# Patient Record
(demographics unavailable — no encounter records)

---

## 2024-12-04 NOTE — RISK ASSESSMENT
[Little interest or pleasure doing things] : 1) Little interest or pleasure doing things [Feeling down, depressed, or hopeless] : 2) Feeling down, depressed, or hopeless [0] : 2) Feeling down, depressed, or hopeless: Not at all (0) [PHQ-9 Negative - No further assessment needed] : PHQ-9 Negative - No further assessment needed [[id number]] : 0 [No Increased risk of SCA or SCD] : No Increased risk of SCA or SCD

## 2024-12-04 NOTE — HISTORY OF PRESENT ILLNESS
[Mother] : mother [Yes] : Patient goes to dentist yearly [Normal] : normal [Eats meals with family] : eats meals with family [Grade: ____] : Grade: [unfilled] [Normal Performance] : normal performance [Normal Behavior/Attention] : normal behavior/attention [Normal Homework] : normal homework [Has friends] : has friends [Uses electronic nicotine delivery system] : does not use electronic nicotine delivery system [Uses tobacco] : does not use tobacco [Uses drugs] : does not use drugs  [Drinks alcohol] : does not drink alcohol [Uses safety belts/safety equipment] : uses safety belts/safety equipment  [No] : Patient has not had sexual intercourse [Has ways to cope with stress] : has ways to cope with stress [Displays self-confidence] : displays self-confidence [Has problems with sleep] : does not have problems with sleep [Gets depressed, anxious, or irritable/has mood swings] : does not get depressed, anxious, or irritable/has mood swings [Has thought about hurting self or considered suicide] : has not thought about hurting self or considered suicide [FreeTextEntry7] : pt has been well

## 2025-01-13 NOTE — HISTORY OF PRESENT ILLNESS
[de-identified] : 13 year old female with weight loss, decreased appetite with early satiety July 28, 2024 eval in ED at [facility] for abd pain, nausea and dizziness with syncope. The following day had diarrhea.  2 further episodes in August 2024. Evaluated by neuro as reported headaches, nausea, visual aura.  If she eats after 7 PM will experience vomiting. Typically has abdominal pain in the morning when wakes. Can wake at night with pain. Inc eructation, feels food coming up. No food sticking. BMs occur Nghia deluna 3 or 4.  Weight loss approx 10 pounds  Denies cannabis, vaping Trialed Omeprazole without improvement, felt symptoms were worse.  LMP early January ROS: eyeglasses, headaches Father left December 31, 2022. Limited contact.  Family Hx: Paternal family - migraines mother Hx of H pylori in the past

## 2025-01-13 NOTE — PHYSICAL EXAM
[Well Developed] : well developed [NAD] : in no acute distress [PERRL] : pupils were equal, round, reactive to light  [Moist & Pink Mucous Membranes] : moist and pink mucous membranes [CTAB] : lungs clear to auscultation bilaterally [Regular Rate and Rhythm] : regular rate and rhythm [Normal S1, S2] : normal S1 and S2 [Soft] : soft  [Normal Bowel Sounds] : normal bowel sounds [No HSM] : no hepatosplenomegaly appreciated [Normal Tone] : normal tone [Well-Perfused] : well-perfused [Interactive] : interactive [icteric] : anicteric [Respiratory Distress] : no respiratory distress  [Distended] : non distended [Tender] : non tender [Normal rectal exam] : exam was normal [Edema] : no edema [Cyanosis] : no cyanosis [Rash] : no rash [Jaundice] : no jaundice [FreeTextEntry1] : with eyeglasses

## 2025-01-13 NOTE — ASSESSMENT
[FreeTextEntry1] : 13 year old female with headaches, abdominal pain, nausea, bloating with decreased appetite, early satiety and 10 pound weight loss. Weight loss is most likely secondary to decreased appetite and early satiety. This may be related to migraine headaches or underlying GI disorder. Therefore would assess further for a systemic or inflammatory autoimmune process including celiac disease as well as gastroesophageal reflux disease and peptic disease including possible infectious etiologies. Other potential etiologies include but are not limited to lactose intolerance or other malabsorptive process including sugar alcohols as well as small intestinal bacterial overgrowth.   Plan: lab assessment stool eval for H pylori (mother had infection in the past) f/u CNS imaging as per neuro consider further assessment and therapy pending clinical status and above results f/u appt after imaging is obtained

## 2025-01-13 NOTE — CONSULT LETTER
[Dear  ___] : Dear  [unfilled], [Consult Letter:] : I had the pleasure of evaluating your patient, [unfilled]. [Please see my note below.] : Please see my note below. [Consult Closing:] : Thank you very much for allowing me to participate in the care of this patient.  If you have any questions, please do not hesitate to contact me. [Sincerely,] : Sincerely, [FreeTextEntry3] : Abel Chapa MD Division of Pediatric Gastroenterology [facility]